# Patient Record
Sex: MALE | Race: BLACK OR AFRICAN AMERICAN | NOT HISPANIC OR LATINO | URBAN - METROPOLITAN AREA
[De-identification: names, ages, dates, MRNs, and addresses within clinical notes are randomized per-mention and may not be internally consistent; named-entity substitution may affect disease eponyms.]

---

## 2019-06-01 ENCOUNTER — EMERGENCY (EMERGENCY)
Facility: HOSPITAL | Age: 24
LOS: 1 days | Discharge: HOME | End: 2019-06-01
Admitting: EMERGENCY MEDICINE
Payer: OTHER MISCELLANEOUS

## 2019-06-01 VITALS
OXYGEN SATURATION: 97 % | RESPIRATION RATE: 18 BRPM | HEART RATE: 76 BPM | SYSTOLIC BLOOD PRESSURE: 168 MMHG | DIASTOLIC BLOOD PRESSURE: 118 MMHG

## 2019-06-01 VITALS
HEART RATE: 83 BPM | OXYGEN SATURATION: 96 % | TEMPERATURE: 98 F | RESPIRATION RATE: 17 BRPM | SYSTOLIC BLOOD PRESSURE: 185 MMHG | DIASTOLIC BLOOD PRESSURE: 115 MMHG
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
## 2019-06-01 DIAGNOSIS — M79.674 PAIN IN RIGHT TOE(S): ICD-10-CM

## 2019-06-01 DIAGNOSIS — S90.121A CONTUSION OF RIGHT LESSER TOE(S) WITHOUT DAMAGE TO NAIL, INITIAL ENCOUNTER: ICD-10-CM

## 2019-06-01 PROCEDURE — 99053 MED SERV 10PM-8AM 24 HR FAC: CPT

## 2019-06-01 PROCEDURE — 99283 EMERGENCY DEPT VISIT LOW MDM: CPT | Mod: 25

## 2019-06-01 PROCEDURE — 73630 X-RAY EXAM OF FOOT: CPT | Mod: 26,RT

## 2019-06-01 RX ADMIN — Medication 500 MILLIGRAM(S): at 02:46

## 2019-06-01 NOTE — ED ADULT TRIAGE NOTE - CCCP TRG CHIEF CMPLNT
Addendum  created 04/13/19 1443 by Mikael Garcia MD    Charge Capture section accepted, Sign clinical note       toe pain
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
## 2019-06-01 NOTE — ED ADULT NURSE NOTE - FINAL NURSING ELECTRONIC SIGNATURE
How Severe Is Your Skin Lesion?: mild Has Your Skin Lesion Been Treated?: not been treated Is This A New Presentation, Or A Follow-Up?: Skin Lesions 01-Jun-2019 02:50

## 2019-06-01 NOTE — ED PROVIDER NOTE - DIAGNOSTIC INTERPRETATION
Xray (wet reads) interpreted by DAVID CARPENTER   Xray foot - + mild soft tissue swelling. no acute fx or dislocation, joint space intact, no effusion noted. No foreign body noted
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
## 2019-06-01 NOTE — ED ADULT NURSE NOTE - CHPI ED NUR SYMPTOMS NEG
no stiffness/no bruising/no back pain/no fever/no numbness/no difficulty bearing weight/no tingling/no weakness/no deformity/no abrasion
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
